# Patient Record
Sex: FEMALE | Race: WHITE | NOT HISPANIC OR LATINO | Employment: OTHER | URBAN - METROPOLITAN AREA
[De-identification: names, ages, dates, MRNs, and addresses within clinical notes are randomized per-mention and may not be internally consistent; named-entity substitution may affect disease eponyms.]

---

## 2018-04-25 ENCOUNTER — HOSPITAL ENCOUNTER (OUTPATIENT)
Dept: RADIOLOGY | Facility: MEDICAL CENTER | Age: 73
End: 2018-04-25
Attending: PHYSICAL MEDICINE & REHABILITATION
Payer: MEDICARE

## 2018-04-25 DIAGNOSIS — M54.30 SCIATICA, UNSPECIFIED LATERALITY: ICD-10-CM

## 2018-04-25 DIAGNOSIS — M54.16 LUMBAR RADICULOPATHY: ICD-10-CM

## 2018-04-25 PROCEDURE — 72148 MRI LUMBAR SPINE W/O DYE: CPT

## 2018-07-02 ENCOUNTER — HOSPITAL ENCOUNTER (OUTPATIENT)
Dept: RADIOLOGY | Facility: MEDICAL CENTER | Age: 73
End: 2018-07-02
Attending: NURSE PRACTITIONER
Payer: MEDICARE

## 2018-07-02 DIAGNOSIS — M25.571 RIGHT ANKLE PAIN, UNSPECIFIED CHRONICITY: ICD-10-CM

## 2018-07-02 DIAGNOSIS — M12.871 ARTHROPATHY, TRANSIENT, ANKLE OR FOOT, RIGHT: ICD-10-CM

## 2018-07-02 PROCEDURE — 73700 CT LOWER EXTREMITY W/O DYE: CPT | Mod: RT

## 2018-10-08 DIAGNOSIS — Z01.810 PRE-OPERATIVE CARDIOVASCULAR EXAMINATION: ICD-10-CM

## 2018-10-08 DIAGNOSIS — Z01.812 PRE-OPERATIVE LABORATORY EXAMINATION: ICD-10-CM

## 2018-10-08 LAB
ANION GAP SERPL CALC-SCNC: 7 MMOL/L (ref 0–11.9)
BUN SERPL-MCNC: 20 MG/DL (ref 8–22)
CALCIUM SERPL-MCNC: 10.1 MG/DL (ref 8.5–10.5)
CHLORIDE SERPL-SCNC: 101 MMOL/L (ref 96–112)
CO2 SERPL-SCNC: 27 MMOL/L (ref 20–33)
CREAT SERPL-MCNC: 0.61 MG/DL (ref 0.5–1.4)
EKG IMPRESSION: NORMAL
ERYTHROCYTE [DISTWIDTH] IN BLOOD BY AUTOMATED COUNT: 38.8 FL (ref 35.9–50)
GLUCOSE SERPL-MCNC: 97 MG/DL (ref 65–99)
HCT VFR BLD AUTO: 42.9 % (ref 37–47)
HGB BLD-MCNC: 14.1 G/DL (ref 12–16)
MCH RBC QN AUTO: 29.7 PG (ref 27–33)
MCHC RBC AUTO-ENTMCNC: 32.9 G/DL (ref 33.6–35)
MCV RBC AUTO: 90.3 FL (ref 81.4–97.8)
PLATELET # BLD AUTO: 154 K/UL (ref 164–446)
PMV BLD AUTO: 11.2 FL (ref 9–12.9)
POTASSIUM SERPL-SCNC: 4.3 MMOL/L (ref 3.6–5.5)
RBC # BLD AUTO: 4.75 M/UL (ref 4.2–5.4)
SODIUM SERPL-SCNC: 135 MMOL/L (ref 135–145)
WBC # BLD AUTO: 4.8 K/UL (ref 4.8–10.8)

## 2018-10-08 PROCEDURE — 85027 COMPLETE CBC AUTOMATED: CPT

## 2018-10-08 PROCEDURE — 93010 ELECTROCARDIOGRAM REPORT: CPT | Performed by: INTERNAL MEDICINE

## 2018-10-08 PROCEDURE — 36415 COLL VENOUS BLD VENIPUNCTURE: CPT

## 2018-10-08 PROCEDURE — 80048 BASIC METABOLIC PNL TOTAL CA: CPT

## 2018-10-08 PROCEDURE — 93005 ELECTROCARDIOGRAM TRACING: CPT

## 2018-10-08 RX ORDER — GABAPENTIN 100 MG/1
900 CAPSULE ORAL 3 TIMES DAILY
COMMUNITY

## 2018-10-08 RX ORDER — PREDNISONE 1 MG/1
4 TABLET ORAL EVERY MORNING
COMMUNITY

## 2018-10-08 RX ORDER — IBUPROFEN 200 MG
400 TABLET ORAL 3 TIMES DAILY
COMMUNITY

## 2018-10-08 NOTE — DISCHARGE PLANNING
Met with pt and her  Luciano.  She currently has a cane and shower stool. Her neighbor is loaning her a knee scooter and a rolling walker.  She has ramps around her home to get in and just 1 step.  She has crutches ordered and we discussed and she said she felt more comfortable with a FWW. 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 Spoke to Kalee at Dr. medrano's office and she will be sending new orders for a walker. She will be going home with  and son Alex will be there as well for recovery.  She has a f/u appt with Dr. Medrano on 10/30.   no

## 2018-10-15 ENCOUNTER — APPOINTMENT (OUTPATIENT)
Dept: RADIOLOGY | Facility: MEDICAL CENTER | Age: 73
DRG: 469 | End: 2018-10-15
Attending: ORTHOPAEDIC SURGERY
Payer: MEDICARE

## 2018-10-15 ENCOUNTER — HOSPITAL ENCOUNTER (INPATIENT)
Facility: MEDICAL CENTER | Age: 73
LOS: 1 days | DRG: 469 | End: 2018-10-16
Attending: ORTHOPAEDIC SURGERY | Admitting: ORTHOPAEDIC SURGERY
Payer: MEDICARE

## 2018-10-15 PROCEDURE — 700112 HCHG RX REV CODE 229: Performed by: ORTHOPAEDIC SURGERY

## 2018-10-15 PROCEDURE — 160022 HCHG BLOCK: Performed by: ORTHOPAEDIC SURGERY

## 2018-10-15 PROCEDURE — 500881 HCHG PACK, EXTREMITY: Performed by: ORTHOPAEDIC SURGERY

## 2018-10-15 PROCEDURE — 502000 HCHG MISC OR IMPLANTS RC 0278: Performed by: ORTHOPAEDIC SURGERY

## 2018-10-15 PROCEDURE — 160009 HCHG ANES TIME/MIN: Performed by: ORTHOPAEDIC SURGERY

## 2018-10-15 PROCEDURE — A9270 NON-COVERED ITEM OR SERVICE: HCPCS | Performed by: ANESTHESIOLOGY

## 2018-10-15 PROCEDURE — A6454 SELF-ADHER BAND W>=3" <5"/YD: HCPCS | Performed by: ORTHOPAEDIC SURGERY

## 2018-10-15 PROCEDURE — 0QHG04Z INSERTION OF INTERNAL FIXATION DEVICE INTO RIGHT TIBIA, OPEN APPROACH: ICD-10-PCS | Performed by: ORTHOPAEDIC SURGERY

## 2018-10-15 PROCEDURE — 160029 HCHG SURGERY MINUTES - 1ST 30 MINS LEVEL 4: Performed by: ORTHOPAEDIC SURGERY

## 2018-10-15 PROCEDURE — 700111 HCHG RX REV CODE 636 W/ 250 OVERRIDE (IP): Performed by: ORTHOPAEDIC SURGERY

## 2018-10-15 PROCEDURE — A9270 NON-COVERED ITEM OR SERVICE: HCPCS | Performed by: ORTHOPAEDIC SURGERY

## 2018-10-15 PROCEDURE — A6223 GAUZE >16<=48 NO W/SAL W/O B: HCPCS | Performed by: ORTHOPAEDIC SURGERY

## 2018-10-15 PROCEDURE — 160035 HCHG PACU - 1ST 60 MINS PHASE I: Performed by: ORTHOPAEDIC SURGERY

## 2018-10-15 PROCEDURE — 700111 HCHG RX REV CODE 636 W/ 250 OVERRIDE (IP)

## 2018-10-15 PROCEDURE — 500367 HCHG DRAIN KIT, HEMOVAC: Performed by: ORTHOPAEDIC SURGERY

## 2018-10-15 PROCEDURE — 501838 HCHG SUTURE GENERAL: Performed by: ORTHOPAEDIC SURGERY

## 2018-10-15 PROCEDURE — 700101 HCHG RX REV CODE 250

## 2018-10-15 PROCEDURE — 0SRF0JZ REPLACEMENT OF RIGHT ANKLE JOINT WITH SYNTHETIC SUBSTITUTE, OPEN APPROACH: ICD-10-PCS | Performed by: ORTHOPAEDIC SURGERY

## 2018-10-15 PROCEDURE — 0QUG07Z SUPPLEMENT RIGHT TIBIA WITH AUTOLOGOUS TISSUE SUBSTITUTE, OPEN APPROACH: ICD-10-PCS | Performed by: ORTHOPAEDIC SURGERY

## 2018-10-15 PROCEDURE — 160002 HCHG RECOVERY MINUTES (STAT): Performed by: ORTHOPAEDIC SURGERY

## 2018-10-15 PROCEDURE — 502240 HCHG MISC OR SUPPLY RC 0272: Performed by: ORTHOPAEDIC SURGERY

## 2018-10-15 PROCEDURE — 700102 HCHG RX REV CODE 250 W/ 637 OVERRIDE(OP): Performed by: ANESTHESIOLOGY

## 2018-10-15 PROCEDURE — A4306 DRUG DELIVERY SYSTEM <=50 ML: HCPCS | Performed by: ORTHOPAEDIC SURGERY

## 2018-10-15 PROCEDURE — 160041 HCHG SURGERY MINUTES - EA ADDL 1 MIN LEVEL 4: Performed by: ORTHOPAEDIC SURGERY

## 2018-10-15 PROCEDURE — 503036 HCHG GUIDE PIN,OIC: Performed by: ORTHOPAEDIC SURGERY

## 2018-10-15 PROCEDURE — 700102 HCHG RX REV CODE 250 W/ 637 OVERRIDE(OP): Performed by: ORTHOPAEDIC SURGERY

## 2018-10-15 PROCEDURE — A9272 DISP WOUND SUCT, DRSG/ACCESS: HCPCS | Performed by: ORTHOPAEDIC SURGERY

## 2018-10-15 PROCEDURE — 700111 HCHG RX REV CODE 636 W/ 250 OVERRIDE (IP): Performed by: ANESTHESIOLOGY

## 2018-10-15 PROCEDURE — 770001 HCHG ROOM/CARE - MED/SURG/GYN PRIV*

## 2018-10-15 PROCEDURE — 160048 HCHG OR STATISTICAL LEVEL 1-5: Performed by: ORTHOPAEDIC SURGERY

## 2018-10-15 PROCEDURE — 73610 X-RAY EXAM OF ANKLE: CPT | Mod: RT

## 2018-10-15 PROCEDURE — 503339 HCHG DRESSSING PICO: Performed by: ORTHOPAEDIC SURGERY

## 2018-10-15 PROCEDURE — 160036 HCHG PACU - EA ADDL 30 MINS PHASE I: Performed by: ORTHOPAEDIC SURGERY

## 2018-10-15 PROCEDURE — C1713 ANCHOR/SCREW BN/BN,TIS/BN: HCPCS | Performed by: ORTHOPAEDIC SURGERY

## 2018-10-15 PROCEDURE — A4306 DRUG DELIVERY SYSTEM <=50 ML: HCPCS | Performed by: ANESTHESIOLOGY

## 2018-10-15 DEVICE — SCREW CANN 4.0X40 SHORT OIC (3TX3+2TX2=13): Type: IMPLANTABLE DEVICE | Site: ANKLE | Status: FUNCTIONAL

## 2018-10-15 DEVICE — IMPLANTABLE DEVICE: Type: IMPLANTABLE DEVICE | Site: ANKLE | Status: FUNCTIONAL

## 2018-10-15 RX ORDER — HALOPERIDOL 5 MG/ML
1 INJECTION INTRAMUSCULAR
Status: DISCONTINUED | OUTPATIENT
Start: 2018-10-15 | End: 2018-10-15 | Stop reason: HOSPADM

## 2018-10-15 RX ORDER — GABAPENTIN 300 MG/1
300 CAPSULE ORAL ONCE
Status: COMPLETED | OUTPATIENT
Start: 2018-10-15 | End: 2018-10-15

## 2018-10-15 RX ORDER — ACETAMINOPHEN 500 MG
1000 TABLET ORAL ONCE
Status: COMPLETED | OUTPATIENT
Start: 2018-10-15 | End: 2018-10-15

## 2018-10-15 RX ORDER — DOCUSATE SODIUM 100 MG/1
100 CAPSULE, LIQUID FILLED ORAL 2 TIMES DAILY
COMMUNITY

## 2018-10-15 RX ORDER — CEFAZOLIN SODIUM 2 G/100ML
2 INJECTION, SOLUTION INTRAVENOUS EVERY 8 HOURS
Status: COMPLETED | OUTPATIENT
Start: 2018-10-15 | End: 2018-10-16

## 2018-10-15 RX ORDER — SODIUM CHLORIDE, SODIUM LACTATE, POTASSIUM CHLORIDE, CALCIUM CHLORIDE 600; 310; 30; 20 MG/100ML; MG/100ML; MG/100ML; MG/100ML
INJECTION, SOLUTION INTRAVENOUS ONCE
Status: COMPLETED | OUTPATIENT
Start: 2018-10-15 | End: 2018-10-15

## 2018-10-15 RX ORDER — DOCUSATE SODIUM 100 MG/1
100 CAPSULE, LIQUID FILLED ORAL 2 TIMES DAILY
Status: DISCONTINUED | OUTPATIENT
Start: 2018-10-15 | End: 2018-10-16 | Stop reason: HOSPADM

## 2018-10-15 RX ORDER — OXYCODONE HYDROCHLORIDE 5 MG/1
5 TABLET ORAL
Status: DISCONTINUED | OUTPATIENT
Start: 2018-10-15 | End: 2018-10-16 | Stop reason: HOSPADM

## 2018-10-15 RX ORDER — LABETALOL HYDROCHLORIDE 5 MG/ML
5 INJECTION, SOLUTION INTRAVENOUS
Status: DISCONTINUED | OUTPATIENT
Start: 2018-10-15 | End: 2018-10-15 | Stop reason: HOSPADM

## 2018-10-15 RX ORDER — ONDANSETRON 2 MG/ML
4 INJECTION INTRAMUSCULAR; INTRAVENOUS EVERY 4 HOURS PRN
Status: DISCONTINUED | OUTPATIENT
Start: 2018-10-15 | End: 2018-10-16 | Stop reason: HOSPADM

## 2018-10-15 RX ORDER — AMOXICILLIN 250 MG
1 CAPSULE ORAL
Status: DISCONTINUED | OUTPATIENT
Start: 2018-10-15 | End: 2018-10-16 | Stop reason: HOSPADM

## 2018-10-15 RX ORDER — SODIUM CHLORIDE, SODIUM LACTATE, POTASSIUM CHLORIDE, CALCIUM CHLORIDE 600; 310; 30; 20 MG/100ML; MG/100ML; MG/100ML; MG/100ML
INJECTION, SOLUTION INTRAVENOUS CONTINUOUS
Status: DISCONTINUED | OUTPATIENT
Start: 2018-10-15 | End: 2018-10-15 | Stop reason: HOSPADM

## 2018-10-15 RX ORDER — LIDOCAINE HYDROCHLORIDE 10 MG/ML
INJECTION, SOLUTION INFILTRATION; PERINEURAL
Status: COMPLETED
Start: 2018-10-15 | End: 2018-10-15

## 2018-10-15 RX ORDER — AMOXICILLIN 250 MG
1 CAPSULE ORAL NIGHTLY
Status: DISCONTINUED | OUTPATIENT
Start: 2018-10-15 | End: 2018-10-16 | Stop reason: HOSPADM

## 2018-10-15 RX ORDER — MAGNESIUM HYDROXIDE 1200 MG/15ML
LIQUID ORAL
Status: COMPLETED | OUTPATIENT
Start: 2018-10-15 | End: 2018-10-15

## 2018-10-15 RX ORDER — PREDNISONE 1 MG/1
4 TABLET ORAL EVERY MORNING
Status: DISCONTINUED | OUTPATIENT
Start: 2018-10-16 | End: 2018-10-16 | Stop reason: HOSPADM

## 2018-10-15 RX ORDER — DIPHENHYDRAMINE HYDROCHLORIDE 50 MG/ML
25 INJECTION INTRAMUSCULAR; INTRAVENOUS EVERY 6 HOURS PRN
Status: DISCONTINUED | OUTPATIENT
Start: 2018-10-15 | End: 2018-10-16 | Stop reason: HOSPADM

## 2018-10-15 RX ORDER — ENEMA 19; 7 G/133ML; G/133ML
1 ENEMA RECTAL
Status: DISCONTINUED | OUTPATIENT
Start: 2018-10-15 | End: 2018-10-16 | Stop reason: HOSPADM

## 2018-10-15 RX ORDER — BISACODYL 10 MG
10 SUPPOSITORY, RECTAL RECTAL
Status: DISCONTINUED | OUTPATIENT
Start: 2018-10-15 | End: 2018-10-16 | Stop reason: HOSPADM

## 2018-10-15 RX ORDER — ACETAMINOPHEN 500 MG
1000 TABLET ORAL EVERY 6 HOURS
Status: DISCONTINUED | OUTPATIENT
Start: 2018-10-16 | End: 2018-10-16 | Stop reason: HOSPADM

## 2018-10-15 RX ORDER — ASPIRIN 81 MG/1
81 TABLET, CHEWABLE ORAL EVERY MORNING
Status: DISCONTINUED | OUTPATIENT
Start: 2018-10-16 | End: 2018-10-16 | Stop reason: HOSPADM

## 2018-10-15 RX ORDER — HYDROMORPHONE HYDROCHLORIDE 2 MG/ML
0.2 INJECTION, SOLUTION INTRAMUSCULAR; INTRAVENOUS; SUBCUTANEOUS
Status: DISCONTINUED | OUTPATIENT
Start: 2018-10-15 | End: 2018-10-15 | Stop reason: HOSPADM

## 2018-10-15 RX ORDER — OXYCODONE HCL 5 MG/5 ML
5 SOLUTION, ORAL ORAL
Status: COMPLETED | OUTPATIENT
Start: 2018-10-15 | End: 2018-10-15

## 2018-10-15 RX ORDER — MIDAZOLAM HYDROCHLORIDE 1 MG/ML
1 INJECTION INTRAMUSCULAR; INTRAVENOUS
Status: DISCONTINUED | OUTPATIENT
Start: 2018-10-15 | End: 2018-10-15 | Stop reason: HOSPADM

## 2018-10-15 RX ORDER — HYDROMORPHONE HYDROCHLORIDE 2 MG/ML
0.5 INJECTION, SOLUTION INTRAMUSCULAR; INTRAVENOUS; SUBCUTANEOUS
Status: DISCONTINUED | OUTPATIENT
Start: 2018-10-15 | End: 2018-10-16 | Stop reason: HOSPADM

## 2018-10-15 RX ORDER — MEPERIDINE HYDROCHLORIDE 25 MG/ML
12.5 INJECTION INTRAMUSCULAR; INTRAVENOUS; SUBCUTANEOUS
Status: DISCONTINUED | OUTPATIENT
Start: 2018-10-15 | End: 2018-10-15 | Stop reason: HOSPADM

## 2018-10-15 RX ORDER — ONDANSETRON 2 MG/ML
4 INJECTION INTRAMUSCULAR; INTRAVENOUS
Status: DISCONTINUED | OUTPATIENT
Start: 2018-10-15 | End: 2018-10-15 | Stop reason: HOSPADM

## 2018-10-15 RX ORDER — OXYCODONE HYDROCHLORIDE 10 MG/1
10 TABLET ORAL
Status: DISCONTINUED | OUTPATIENT
Start: 2018-10-15 | End: 2018-10-16 | Stop reason: HOSPADM

## 2018-10-15 RX ORDER — DIPHENHYDRAMINE HYDROCHLORIDE 50 MG/ML
12.5 INJECTION INTRAMUSCULAR; INTRAVENOUS
Status: DISCONTINUED | OUTPATIENT
Start: 2018-10-15 | End: 2018-10-15 | Stop reason: HOSPADM

## 2018-10-15 RX ORDER — DOCUSATE SODIUM 100 MG/1
100 CAPSULE, LIQUID FILLED ORAL 2 TIMES DAILY
Status: DISCONTINUED | OUTPATIENT
Start: 2018-10-15 | End: 2018-10-15

## 2018-10-15 RX ORDER — METOPROLOL TARTRATE 1 MG/ML
1 INJECTION, SOLUTION INTRAVENOUS
Status: DISCONTINUED | OUTPATIENT
Start: 2018-10-15 | End: 2018-10-15 | Stop reason: HOSPADM

## 2018-10-15 RX ORDER — HYDROMORPHONE HYDROCHLORIDE 2 MG/ML
0.1 INJECTION, SOLUTION INTRAMUSCULAR; INTRAVENOUS; SUBCUTANEOUS
Status: DISCONTINUED | OUTPATIENT
Start: 2018-10-15 | End: 2018-10-15 | Stop reason: HOSPADM

## 2018-10-15 RX ORDER — GABAPENTIN 100 MG/1
200 CAPSULE ORAL
Status: DISCONTINUED | OUTPATIENT
Start: 2018-10-15 | End: 2018-10-16 | Stop reason: HOSPADM

## 2018-10-15 RX ORDER — HYDROMORPHONE HYDROCHLORIDE 2 MG/ML
0.4 INJECTION, SOLUTION INTRAMUSCULAR; INTRAVENOUS; SUBCUTANEOUS
Status: DISCONTINUED | OUTPATIENT
Start: 2018-10-15 | End: 2018-10-15 | Stop reason: HOSPADM

## 2018-10-15 RX ORDER — OXYCODONE HCL 5 MG/5 ML
10 SOLUTION, ORAL ORAL
Status: COMPLETED | OUTPATIENT
Start: 2018-10-15 | End: 2018-10-15

## 2018-10-15 RX ORDER — POLYETHYLENE GLYCOL 3350 17 G/17G
1 POWDER, FOR SOLUTION ORAL 2 TIMES DAILY PRN
Status: DISCONTINUED | OUTPATIENT
Start: 2018-10-15 | End: 2018-10-16 | Stop reason: HOSPADM

## 2018-10-15 RX ORDER — HYDRALAZINE HYDROCHLORIDE 20 MG/ML
5 INJECTION INTRAMUSCULAR; INTRAVENOUS
Status: DISCONTINUED | OUTPATIENT
Start: 2018-10-15 | End: 2018-10-15 | Stop reason: HOSPADM

## 2018-10-15 RX ADMIN — SODIUM CHLORIDE, SODIUM LACTATE, POTASSIUM CHLORIDE, CALCIUM CHLORIDE: 600; 310; 30; 20 INJECTION, SOLUTION INTRAVENOUS at 12:49

## 2018-10-15 RX ADMIN — GABAPENTIN 200 MG: 100 CAPSULE ORAL at 22:39

## 2018-10-15 RX ADMIN — ACETAMINOPHEN 1000 MG: 500 TABLET ORAL at 23:29

## 2018-10-15 RX ADMIN — OXYCODONE HYDROCHLORIDE 5 MG: 5 SOLUTION ORAL at 20:14

## 2018-10-15 RX ADMIN — LIDOCAINE HYDROCHLORIDE 0.5 ML: 10 INJECTION, SOLUTION INFILTRATION; PERINEURAL at 12:49

## 2018-10-15 RX ADMIN — SODIUM CHLORIDE, SODIUM LACTATE, POTASSIUM CHLORIDE, CALCIUM CHLORIDE: 600; 310; 30; 20 INJECTION, SOLUTION INTRAVENOUS at 20:15

## 2018-10-15 RX ADMIN — CEFAZOLIN SODIUM 2 G: 2 INJECTION, SOLUTION INTRAVENOUS at 22:39

## 2018-10-15 RX ADMIN — GABAPENTIN 300 MG: 300 CAPSULE ORAL at 13:02

## 2018-10-15 RX ADMIN — ACETAMINOPHEN 1000 MG: 500 TABLET, FILM COATED ORAL at 13:02

## 2018-10-15 RX ADMIN — OXYCODONE HYDROCHLORIDE 5 MG: 5 TABLET ORAL at 23:29

## 2018-10-15 RX ADMIN — DOCUSATE SODIUM 100 MG: 100 CAPSULE, LIQUID FILLED ORAL at 22:39

## 2018-10-15 ASSESSMENT — COGNITIVE AND FUNCTIONAL STATUS - GENERAL
DAILY ACTIVITIY SCORE: 22
DRESSING REGULAR LOWER BODY CLOTHING: A LITTLE
TOILETING: A LITTLE
STANDING UP FROM CHAIR USING ARMS: A LITTLE
SUGGESTED CMS G CODE MODIFIER DAILY ACTIVITY: CJ
SUGGESTED CMS G CODE MODIFIER MOBILITY: CJ
MOBILITY SCORE: 21
WALKING IN HOSPITAL ROOM: A LITTLE
CLIMB 3 TO 5 STEPS WITH RAILING: A LITTLE

## 2018-10-15 ASSESSMENT — LIFESTYLE VARIABLES
EVER_SMOKED: NEVER
ALCOHOL_USE: NO

## 2018-10-15 ASSESSMENT — PATIENT HEALTH QUESTIONNAIRE - PHQ9
1. LITTLE INTEREST OR PLEASURE IN DOING THINGS: NOT AT ALL
SUM OF ALL RESPONSES TO PHQ9 QUESTIONS 1 AND 2: 0
2. FEELING DOWN, DEPRESSED, IRRITABLE, OR HOPELESS: NOT AT ALL

## 2018-10-15 ASSESSMENT — PAIN SCALES - GENERAL
PAINLEVEL_OUTOF10: 4
PAINLEVEL_OUTOF10: 0
PAINLEVEL_OUTOF10: 3

## 2018-10-16 VITALS
OXYGEN SATURATION: 97 % | HEART RATE: 79 BPM | TEMPERATURE: 98.2 F | SYSTOLIC BLOOD PRESSURE: 118 MMHG | HEIGHT: 66 IN | RESPIRATION RATE: 15 BRPM | WEIGHT: 130.51 LBS | DIASTOLIC BLOOD PRESSURE: 61 MMHG | BODY MASS INDEX: 20.98 KG/M2

## 2018-10-16 PROCEDURE — 700111 HCHG RX REV CODE 636 W/ 250 OVERRIDE (IP): Performed by: ORTHOPAEDIC SURGERY

## 2018-10-16 PROCEDURE — G8989 SELF CARE D/C STATUS: HCPCS | Mod: CJ

## 2018-10-16 PROCEDURE — 97165 OT EVAL LOW COMPLEX 30 MIN: CPT

## 2018-10-16 PROCEDURE — 700112 HCHG RX REV CODE 229: Performed by: ORTHOPAEDIC SURGERY

## 2018-10-16 PROCEDURE — G8978 MOBILITY CURRENT STATUS: HCPCS | Mod: CI

## 2018-10-16 PROCEDURE — 700102 HCHG RX REV CODE 250 W/ 637 OVERRIDE(OP): Mod: JG | Performed by: ORTHOPAEDIC SURGERY

## 2018-10-16 PROCEDURE — G8979 MOBILITY GOAL STATUS: HCPCS | Mod: CI

## 2018-10-16 PROCEDURE — G8987 SELF CARE CURRENT STATUS: HCPCS | Mod: CJ

## 2018-10-16 PROCEDURE — G8988 SELF CARE GOAL STATUS: HCPCS | Mod: CJ

## 2018-10-16 PROCEDURE — A9270 NON-COVERED ITEM OR SERVICE: HCPCS | Performed by: ORTHOPAEDIC SURGERY

## 2018-10-16 PROCEDURE — G8980 MOBILITY D/C STATUS: HCPCS | Mod: CI

## 2018-10-16 PROCEDURE — A9270 NON-COVERED ITEM OR SERVICE: HCPCS | Mod: JG | Performed by: ORTHOPAEDIC SURGERY

## 2018-10-16 PROCEDURE — 97161 PT EVAL LOW COMPLEX 20 MIN: CPT

## 2018-10-16 RX ADMIN — CEFAZOLIN SODIUM 2 G: 2 INJECTION, SOLUTION INTRAVENOUS at 04:54

## 2018-10-16 RX ADMIN — PSYLLIUM HUSK 1 PACKET: 3.4 POWDER ORAL at 04:55

## 2018-10-16 RX ADMIN — ACETAMINOPHEN 1000 MG: 500 TABLET ORAL at 04:55

## 2018-10-16 RX ADMIN — PREDNISONE 4 MG: 1 TABLET ORAL at 06:42

## 2018-10-16 RX ADMIN — ESTROGENS, CONJUGATED 0.45 MG: 0.45 TABLET, FILM COATED ORAL at 06:00

## 2018-10-16 RX ADMIN — DOCUSATE SODIUM 100 MG: 100 CAPSULE, LIQUID FILLED ORAL at 04:55

## 2018-10-16 RX ADMIN — OXYCODONE HYDROCHLORIDE 5 MG: 5 TABLET ORAL at 11:25

## 2018-10-16 RX ADMIN — Medication 81 MG: at 04:55

## 2018-10-16 RX ADMIN — ACETAMINOPHEN 1000 MG: 500 TABLET ORAL at 11:25

## 2018-10-16 ASSESSMENT — COGNITIVE AND FUNCTIONAL STATUS - GENERAL
SUGGESTED CMS G CODE MODIFIER MOBILITY: CI
SUGGESTED CMS G CODE MODIFIER DAILY ACTIVITY: CJ
TOILETING: A LITTLE
CLIMB 3 TO 5 STEPS WITH RAILING: A LITTLE
DAILY ACTIVITIY SCORE: 22
MOBILITY SCORE: 23
HELP NEEDED FOR BATHING: A LITTLE

## 2018-10-16 ASSESSMENT — GAIT ASSESSMENTS
DISTANCE (FEET): 30
DEVIATION: STEP TO;OTHER (COMMENT)
GAIT LEVEL OF ASSIST: STAND BY ASSIST
ASSISTIVE DEVICE: FRONT WHEEL WALKER

## 2018-10-16 ASSESSMENT — PAIN SCALES - GENERAL
PAINLEVEL_OUTOF10: 2
PAINLEVEL_OUTOF10: 5

## 2018-10-16 ASSESSMENT — ACTIVITIES OF DAILY LIVING (ADL): TOILETING: INDEPENDENT

## 2018-10-16 NOTE — DISCHARGE INSTRUCTIONS
Discharge Instructions    Discharged to home by car with relative. Discharged via wheelchair, hospital escort: Yes.  Special equipment needed: Walker    Be sure to schedule a follow-up appointment with your primary care doctor or any specialists as instructed.     Discharge Plan:   Influenza Vaccine Indication: Not indicated: Previously immunized this influenza season and > 8 years of age    I understand that a diet low in cholesterol, fat, and sodium is recommended for good health. Unless I have been given specific instructions below for another diet, I accept this instruction as my diet prescription.   Other diet: Regular    Special Instructions: Discharge instructions for the Orthopedic Patient    Follow up with Primary Care Physician within 2 weeks of discharge to home, regarding:  Review of medications and diagnostic testing.  Surveillance for medical complications.  Workup and treatment of osteoporosis, if appropriate.     -Is this a Joint Replacement patient? No    -Is this patient being discharged with medication to prevent blood clots?  yes    · Is patient discharged on Warfarin / Coumadin?   No     NWB RLE but ok to rest toes on ground when mobilizing   Elevate RLE above heart to decrease pain and swelling   Keep splint c/d/i until follow-up   Pull hemovac drain on POD#2 (Wednesday) if not pulled prior to discharge   Pull OnQ catheter on POD#3 (Thursday)   Turn on Incisional Vac pump on POD#7 (Monday), but leave dressing in place until follow-up   Resume regular home diet   Follow-up with Dr. Casarez in 2 weeks      How can pain medicine affect me?  You were prescribed pain medicine. This medicine may:  · Make you tired or sleepy.  · Make you feel dizzy.  · Affect how well you can:  ¨ Drive  ¨ Do certain activities.  Pain medicine may not make all of your pain go away. You should be comfortable enough to:  · Move.  · Breathe.  · Take care of yourself.  How often should I take pain medicine and how much  should I take?  · Take pain medicine only as told by your doctor and only as needed for pain.  · You do not need to take pain medicine if you are not having pain, unless your doctor tells you to do that.  · You can take less than the prescribed dose if you find that less medicine helps your pain.  · If you have very bad (severe) pain, call your doctor. Do not take more pills than told by your doctor. Do not take pills more often than told by your doctor.  What should I avoid while I am taking pain medicine?  Follow these instructions after you start taking pain medicine, while you are taking the medicine, and for 8 hours after you stop taking the medicine:  · Do not drive.  · Do not use machinery.  · Do not use power tools.  · Do not sign legal documents.  · Do not drink alcohol.  · Do not take sleeping pills.  · Do not take care of children by yourself.  · Do not do any activities that involve climbing or being in high places.  · Do not go into any body of water unless there is an adult nearby who can watch and help you. This includes:  ¨ Lakes.  ¨ Rivers.  ¨ Oceans.  ¨ Spas.  ¨ Swimming pools.  How can I keep others safe while I am taking pain medicine?  · Store your pain medicine as told by your doctor. Make sure that you keep it where children and pets cannot reach it.  · Do not share your pain medicine with anyone.  · Do not save any leftover pills. If you have any leftover pain medicine, get rid of it or destroy it as told by your doctor.  What else do I need to know about taking pain medicine?  · Use a poop (stool) softener if you have trouble pooping (constipation) because of your pain medicine. Eating more fruits and vegetables also helps with constipation.  · Write down the times when you take your pain medicine. Look at the times before you take your next dose of medicine.  · Your pain medicine might have acetaminophen in it. Do not take any other acetaminophen while you are taking this medicine. An  overdose of acetaminophen can do very bad damage to your liver. If you are taking any medicines in addition to your pain medicine, check the active ingredients on those medicines to see if acetaminophen is listed.  When should I call my doctor?  · Your medicine is not helping the pain.  · You do either of these soon after you take the medicine:  ¨ Throw up (vomit).  ¨ Have watery poop (diarrhea).  · You have new pain in areas that did not hurt before.  · You have an allergic reaction to your medicine. This may include:  ¨ Feeling itchy.  ¨ Swelling.  ¨ Feeling dizzy.  ¨ Getting a new rash.  · You cannot put up with feeling:  ¨ Dizzy.  ¨ Sick to your stomach (nauseous).  When should I call 911 or go to the emergency room?  · You pass out (faint).  · You feel very confused.  · You throw up again and again.  · Your skin or lips turn pale or bluish in color.  · You are:  ¨ Short of breath.  ¨ Breathing much more slowly than usual.  · You have a very bad allergic reaction to your medicine. This includes:  ¨ Developing a swollen tongue.  ¨ Having trouble breathing.  This information is not intended to replace advice given to you by your health care provider. Make sure you discuss any questions you have with your health care provider.  Document Released: 06/05/2009 Document Revised: 08/24/2017 Document Reviewed: 10/22/2015  © 2017 Elsevier          Aspirin, ASA oral tablets  What is this medicine?  ASPIRIN (AS pir in) is a pain reliever. It is used to treat mild pain and fever. This medicine is also used as directed by a doctor to prevent and to treat heart attacks, to prevent strokes, and to treat arthritis or inflammation.  This medicine may be used for other purposes; ask your health care provider or pharmacist if you have questions.  COMMON BRAND NAME(S): Aspir-Low, Aspir-Deborah, Aspirtab, Paul Advanced Aspirin, Paul Aspirin, Paul Aspirin Extra Strength, Paul Aspirin Plus, Paul Extra Strength, Paul Extra Strength  Plus, Paul Genuine Aspirin, Paul Womens Aspirin, Bufferin, Bufferin Extra Strength, Bufferin Low Dose  What should I tell my health care provider before I take this medicine?  They need to know if you have any of these conditions:  -anemia  -asthma  -bleeding problems  -child with chickenpox, the flu, or other viral infection  -diabetes  -gout  -if you frequently drink alcohol containing drinks  -kidney disease  -liver disease  -low level of vitamin K  -lupus  -smoke tobacco  -stomach ulcers or other problems  -an unusual or allergic reaction to aspirin, tartrazine dye, other medicines, dyes, or preservatives  -pregnant or trying to get pregnant  -breast-feeding  How should I use this medicine?  Take this medicine by mouth with a glass of water. Follow the directions on the package or prescription label. You can take this medicine with or without food. If it upsets your stomach, take it with food. Do not take your medicine more often than directed.  Talk to your pediatrician regarding the use of this medicine in children. While this drug may be prescribed for children as young as 12 years of age for selected conditions, precautions do apply. Children and teenagers should not use this medicine to treat chicken pox or flu symptoms unless directed by a doctor.  Patients over 65 years old may have a stronger reaction and need a smaller dose.  Overdosage: If you think you have taken too much of this medicine contact a poison control center or emergency room at once.  NOTE: This medicine is only for you. Do not share this medicine with others.  What if I miss a dose?  If you are taking this medicine on a regular schedule and miss a dose, take it as soon as you can. If it is almost time for your next dose, take only that dose. Do not take double or extra doses.  What may interact with this medicine?  Do not take this medicine with any of the following medications:  -cidofovir  -ketorolac  -probenecid  This medicine may  also interact with the following medications:  -alcohol  -alendronate  -bismuth subsalicylate  -flavocoxid  -herbal supplements like feverfew, garlic, ursula, ginkgo biloba, horse chestnut  -medicines for diabetes or glaucoma like acetazolamide, methazolamide  -medicines for gout  -medicines that treat or prevent blood clots like enoxaparin, heparin, ticlopidine, warfarin  -other aspirin and aspirin-like medicines  -NSAIDs, medicines for pain and inflammation, like ibuprofen or naproxen  -pemetrexed  -sulfinpyrazone  -varicella live vaccine  This list may not describe all possible interactions. Give your health care provider a list of all the medicines, herbs, non-prescription drugs, or dietary supplements you use. Also tell them if you smoke, drink alcohol, or use illegal drugs. Some items may interact with your medicine.  What should I watch for while using this medicine?  If you are treating yourself for pain, tell your doctor or health care professional if the pain lasts more than 10 days, if it gets worse, or if there is a new or different kind of pain. Tell your doctor if you see redness or swelling. Also, check with your doctor if you have a fever that lasts for more than 3 days. Only take this medicine to prevent heart attacks or blood clotting if prescribed by your doctor or health care professional.  Do not take aspirin or aspirin-like medicines with this medicine. Too much aspirin can be dangerous. Always read the labels carefully.  This medicine can irritate your stomach or cause bleeding problems. Do not smoke cigarettes or drink alcohol while taking this medicine. Do not lie down for 30 minutes after taking this medicine to prevent irritation to your throat.  If you are scheduled for any medical or dental procedure, tell your healthcare provider that you are taking this medicine. You may need to stop taking this medicine before the procedure.  This medicine may be used to treat migraines. If you take  migraine medicines for 10 or more days a month, your migraines may get worse. Keep a diary of headache days and medicine use. Contact your healthcare professional if your migraine attacks occur more frequently.  What side effects may I notice from receiving this medicine?  Side effects that you should report to your doctor or health care professional as soon as possible:  -allergic reactions like skin rash, itching or hives, swelling of the face, lips, or tongue  -breathing problems  -changes in hearing, ringing in the ears  -confusion  -general ill feeling or flu-like symptoms  -pain on swallowing  -redness, blistering, peeling or loosening of the skin, including inside the mouth or nose  -signs and symptoms of bleeding such as bloody or black, tarry stools; red or dark-brown urine; spitting up blood or brown material that looks like coffee grounds; red spots on the skin; unusual bruising or bleeding from the eye, gums, or nose  -trouble passing urine or change in the amount of urine  -unusually weak or tired  -yellowing of the eyes or skin  Side effects that usually do not require medical attention (report to your doctor or health care professional if they continue or are bothersome):  -diarrhea or constipation  -headache  -nausea, vomiting  -stomach gas, heartburn  This list may not describe all possible side effects. Call your doctor for medical advice about side effects. You may report side effects to FDA at 0-366-FDA-6872.  Where should I keep my medicine?  Keep out of the reach of children.  Store at room temperature between 15 and 30 degrees C (59 and 86 degrees F). Protect from heat and moisture. Do not use this medicine if it has a strong vinegar smell. Throw away any unused medicine after the expiration date.  NOTE: This sheet is a summary. It may not cover all possible information. If you have questions about this medicine, talk to your doctor, pharmacist, or health care provider.  © 2018 Elsevier/Gold  Standard (2014-08-19 11:30:31)      Depression / Suicide Risk    As you are discharged from this [facility] facility, it is important to learn how to keep safe from harming yourself.    Recognize the warning signs:  · Abrupt changes in personality, positive or negative- including increase in energy   · Giving away possessions  · Change in eating patterns- significant weight changes-  positive or negative  · Change in sleeping patterns- unable to sleep or sleeping all the time   · Unwillingness or inability to communicate  · Depression  · Unusual sadness, discouragement and loneliness  · Talk of wanting to die  · Neglect of personal appearance   · Rebelliousness- reckless behavior  · Withdrawal from people/activities they love  · Confusion- inability to concentrate     If you or a loved one observes any of these behaviors or has concerns about self-harm, here's what you can do:  · Talk about it- your feelings and reasons for harming yourself  · Remove any means that you might use to hurt yourself (examples: pills, rope, extension cords, firearm)  · Get professional help from the community (Mental Health, Substance Abuse, psychological counseling)  · Do not be alone:Call your Safe Contact- someone whom you trust who will be there for you.  · Call your local CRISIS HOTLINE 439-7436 or 545-375-5256  · Call your local Children's Mobile Crisis Response Team [place] (355) 206-9429 or www.Rescale  · Call the toll free National Suicide Prevention Hotlines   · National Suicide Prevention Lifeline 132-370-DDVV (3442)  · National Hope Line Network 800-SUICIDE (821-8316)

## 2018-10-16 NOTE — PROGRESS NOTES
Pt. discharged to home. Pt left unit via WC with escort.   IV pulled out.   Pt states she has a walker at home  Pt already has prescriptions for pain medications from Dr. Casarez.   Reviewed discharge instructions, follow up appointments, and prescription with the patient. Patient states understanding of all the instructions. Discharge instructions and personal belongings with patient upon leaving.

## 2018-10-16 NOTE — OP REPORT
DATE OF SERVICE:  10/15/2018     PREOPERATIVE DIAGNOSIS:  Right ankle arthritis.     POSTOPERATIVE DIAGNOSIS:  Right ankle arthritis.     PROCEDURE PERFORMED:  1.  Right total ankle arthroplasty.  2.  Right placement of BARRINGTON dressing.    3. Right placement of medial malleolar screws for prophylactic treatment for    potential stress fracture.  4. Right cotton osteotomy with bone graft  5.  Stress Xray ankle     SURGEON:  Scout Casarez MD     FIRST ASSISTANT:  gaby Fuentes MD     SECOND ASSISTANT:  Dr. Alysia Hall.     ANESTHESIA:  General endotracheal with popliteal block catheter per my request   for pain management.     ESTIMATED BLOOD LOSS:  None.     COMPLICATIONS:  None.     DRAINS:  Hemovac x1.     IMPLANTS:  [place] STAR total ankle arthroplasty, tibia Medium, polyethylene 10,   talus Small.     POSTOPERATIVE PLAN:  1.  Perioperative antibiotics.  2.  Follow up in 2 weeks.  3.  Preoperative antibiotics.     INDICATIONS:  The patient is a pleasant 73 year old female.  The patient had problems    with their Right ankle for some time.  The above diagnoses made and options were   discussed including operative and nonoperative.  The patient elected to undergo    operative intervention.  The procedure discussed and all questions were    answered.  Risks of surgery explained to include but not limited to wound    problems, infection, nerve injury, vascular injury, need for further surgery.     The patient understands the potential for persistent risk for pain, malunion, nonunion,    need for further surgery.  The patient understands and accepts these risks and agrees    to proceed.  The site was marked by myself prior to receiving psychotropic    medicines.     PROCEDURE IN DETAIL:  The patient was given a popliteal block and catheter per   my request for pain management.  They were then brought to the operating room and    underwent general endotracheal anesthesia without complications. The Right  lower  extremity was prepped and draped in standard fashion in supine position    with all appropriate padding.  Positive site verification confirmed the patient's Right   lower extremity as well as procedure and confirmation that the patient received    preoperative antibiotics.  Esmarch was used to exsanguinate the foot and ankle   and leg tourniquet was inflated to 250 mmHg.  An anterior incision was made    centered over the anterior ankle.  It was dissected down.  The superficial    peroneal nerve was identified and protected distally.  The interval between    the tibialis anterior and extensor hallus longus was developed.  Neurovascular   bundle was identified and was retracted laterally.  This exposed the ankle    joint.  A guide pin was placed perpendicular to the long axis of the tibia.  Tibial   alignment jig was then placed over the top of this, was pinned in place    parallel to the long axis of the tibia.  This was confirmed on C-arm imaging    on AP view.  On lateral view, the marietta wing was placed and this was    identified to be perpendicular to the long axis of the tibia.  Tibial cut    height was adjusted approximately 5 mm at the subchondral bone margin and    medial and lateral cut was based off the shoulder of the tibia at the medial    malleolar aspect.  This was then pinned in place and the distal tibial    alignment jig was pinned in place.  Malleolar guide pins were placed for later   protection.  Distal tibia cut was made with a combination of oscillating saw    and reciprocating saw.  The tibial cutting jig was removed and all bone was    removed from the distal tibia.  Next, the superior talar cutting guide was    placed onto the tibial alignment jig.  This was then held in neutral position    with slight valgus.  It was pinned in place with talus directly against the    paddle.  The superior cut was made with the oscillating saw.  This was    removed.  The talus was measured to be a SM.  The  guide pin for the     talus was placed through the datum alignment jig was confirmed on    C-arm imaging on AP view to be centered on the talus on lateral view with the    nipple over the lateral process of the talus.  This was then removed and the    anterior, posterior cutting guide was placed over the datum alignment jig pin    and was pinned in place.  The posterior cut was made with the oscillating saw    and the anterior cut was made with the milling drill bit.  An anterior,    posterior cutting guide was then removed and/or guide pin the medial and    lateral cutting guide was then pinned in place into the tibia went to the    talus.  The medial and lateral cuts were made with the reciprocating saw and    bone of the medial and lateral gutters were removed as well as posterior    aspect of the tibia.  Window trial was then placed after the cutting guide was   removed.  It sat very nicely on the bone.  The keel was then drilled.  Window   trial was removed and was punched.  The window trial was also confirmed on    C-arm imaging to be directly against bone as well as direct visualization.     The tibia was then measured and turned to be a Medium.  The gutters were   cleared of all soft tissue.  The talar component was then impacted in place    with the long side lateral.  The barrel guide was then set of the tibia on AP    and lateral views with the blue bar protecting the underlying talar component    was determined to be directly against bone on the lateral.  It was pinned in    place and both the medial and lateral barrels were drilled.  This was removed.    All bone was removed.  A thorough irrigation was performed.  Tibial    component was impacted in place with the blue bar protecting the underlying    talar component.  C-arm imaging confirmed tibia component to remain directly    against the distal tibial bone.  A series of polyethylene trials were placed    and a 10 mm demonstrated good range of motion  with very minimal lift off medial   and lateral. Next, using C-arm imaging, the trial was exchanged   for a 10 mm implant.   The patient's medial malleolar wall was thinned and there was  concern for impending malleolar stress fracture.  Two guide pins from the [facility]    4.0 cannulated screw was placed from the medial malleolus into the distal    tibia avoiding the tibial implant was confirmed on C-arm imaging.  These were    filled with 40 mm partially threaded screws.  The barrels were then bone    Grafted. She still had forefoot varus.  The anterior incision was extended distally to the 1st TMT joint.  A saggital saw was used parallel to the 1st TMT joint to but not through the plantar cortex.  This wa poened up with the Arthrex distractor until forefoot varus was neurtralized.  A 6mm cotton place was placed with 2 screws proximal and distal.  Autologous bone graft was placed into the opening wedge osteotomy.   Final C-arm imaging demonstrates satisfactory position for internal    fixation alignment.  The patient had excellent range of motion.  No gross instability.    Wounds were irrigated with copious irrigation.  Hemovac drain was placed.     The anterior wound was closed with multiple layers closing the capsular layer    followed by an extensor retinacular layer followed by subcutaneous layer, all    with 3-0 Vicryl and 3-0 nylon for the skin.  Sterile dressings were applied    including a negative pressure incisional dressing, which demonstrated a    positive seal.  The patient was placed into a bulky Hernandez splint and woke from    general anesthesia without complications and was transferred to the recovery    room in good condition.  Next, utilization of Dr. Dunn was necessary for   patient positioning, holding, retracting, provisional and definitive    fixation, wound closure, dressing and splint placement.  He was present    throughout the entire procedure.

## 2018-10-16 NOTE — PROGRESS NOTES
Pt came to floor at 2115. Pt post op vitals and assessment was done. Pt was made comfortable in bed. Pt was taught about floor policy.

## 2018-10-16 NOTE — PROGRESS NOTES
· 2 RN skin check complete.   · Devices in place pts left heel is floating on a pillow.  · Skin assessed under devices L heel is red and blanching  · Confirmed pressure ulcers found on no.  · New potential pressure ulcers noted on no. Wound consult and MIDAS placed.  · The following interventions in place pillow in place.    Pts left hand has a bruise. Pt has a splint on the right ankle and an on q pump dressing above that. PTs skin is dry. Pts left heel is red and blanching. PTs left second toe has a callous.

## 2018-10-16 NOTE — THERAPY
"Physical Therapy Evaluation completed.   Bed Mobility:  Supine to Sit: Supervised  Transfers: Sit to Stand: Supervised  Gait: Level Of Assist: Stand by Assist with Front-Wheel Walker       Plan of Care: Patient with no further skilled PT needs in the acute care setting at this time  Discharge Recommendations: Equipment: Front-Wheel Walker.   See \"Rehab Therapy-Acute\" Patient Summary Report for complete documentation.     Pt was seen post op R total ankle arthroplasty with NWB preacautions in place. Pt was able to demonstrate SPV to SBA for all functional mobility w/FWW use. Pt was able to safely demonstrate hop to mechanics w/FWW and demonstrates appropraite functional strength in UE to unweight body for ambulation. Pt declined to attempt steps at this time, as pt will be using a w/c to go up/down steps with assist from spouse or son. Pt educated on ther-ex for RLE to prevent deconitioning of quads and hamstrings. Pt is in no acute skilled PT needs at this time. Anticipate pt to d/c home with family support. Recommend outpatient transitional care services for continued physical therapy services.    "

## 2018-10-16 NOTE — OR NURSING
Attempt to call son multiple times to notify them that there is a delay on getting pt to room.The room was being cleaned, but was stopped.

## 2018-10-16 NOTE — PROGRESS NOTES
[facility] - Foot and Ankle Service    POD# 1 right total ankle arthroplasty, right cotton osteotomy    MIGUE.  Patient denies chest pain, SOB, and dizziness.  Patient is tolerating po intake.    Vitals:    10/15/18 2250 10/15/18 2320 10/16/18 0100 10/16/18 0500   BP: 122/75 122/73 104/61 110/57   Pulse: 84 84 87 86   Resp: 17 17 17 17   Temp: 36.8 °C (98.3 °F) 36.8 °C (98.3 °F) 36.2 °C (97.1 °F) 36.3 °C (97.3 °F)   SpO2: 96% 95% 90% 95%   Weight:       Height:           Lab Results   Component Value Date/Time    WBC 4.8 10/08/2018 04:14 PM    RBC 4.75 10/08/2018 04:14 PM    HEMOGLOBIN 14.1 10/08/2018 04:14 PM    HEMATOCRIT 42.9 10/08/2018 04:14 PM    MCV 90.3 10/08/2018 04:14 PM    MCH 29.7 10/08/2018 04:14 PM    MCHC 32.9 (L) 10/08/2018 04:14 PM    MPV 11.2 10/08/2018 04:14 PM        Lab Results   Component Value Date/Time    SODIUM 135 10/08/2018 04:14 PM    POTASSIUM 4.3 10/08/2018 04:14 PM    CHLORIDE 101 10/08/2018 04:14 PM    CO2 27 10/08/2018 04:14 PM    GLUCOSE 97 10/08/2018 04:14 PM    BUN 20 10/08/2018 04:14 PM    CREATININE 0.61 10/08/2018 04:14 PM        No results found for: PROTHROMBTM, INR     Results     ** No results found for the last 168 hours. **          Drains: hemovac drain 310cc since surgery, incisional vac in place with good seal    Physical Exam:    RLE: splint c/d/i, drain and incisional vac in place, motor intact EHL/FHL, decreased sensation secondary to block, brisk cap refill all toes    A&P:  1) NWB RLE/Ok to rest toes on ground when mobilizing/Mobilize with PT  2) 24 hours Ancef post-op  3) Recheck hemovac output prior to D/C and pull prior to D/C if output less than 40cc over 8 hours.  If still too productive, patient will pull on POD#2  4)Plan for D/C home today  5) F/U with Dr. Casarez in 2 weeks

## 2018-10-16 NOTE — THERAPY
"Occupational Therapy Evaluation completed.   Functional Status: Pt s/p R total ankle arthroplasty, RLE NWB. Pt was able to perform bed mobility with supervision, LB dressing w/ sba, UB dressing supervision, toileting with sba/supervision with techniques to maintain NWB during clothing management, planning on using BSC at home. Pt was able to perform short functional mobility and t/f's using FWW with sba and able to maintain NWB precautions. Pt to have support from spouse and son as needed upon d/c. Pt does not present the need for acute skilled OT services at this time and might benefit from HHS upon d/c home.   Plan of Care: Patient with no further skilled OT needs in the acute care setting at this time  Discharge Recommendations:  Equipment: No Equipment Needed. Post-acute therapy Discharge to home with outpatient or home health for additional skilled therapy services.    See \"Rehab Therapy-Acute\" Patient Summary Report for complete documentation.    "

## 2018-10-16 NOTE — CARE PLAN
Problem: Safety  Goal: Will remain free from injury  Outcome: PROGRESSING AS EXPECTED  Safety precautions in place. Call light within reach. Bed is low and in locked position. Hourly rounding in place.     Problem: Discharge Barriers/Planning  Goal: Patient's continuum of care needs will be met  Outcome: PROGRESSING AS EXPECTED  D/c orders received. Pt to go home today with her son.   Pt has hemovac drain in place  Katheryn paged TITO to clarify if pt needs to take the drain out by herself tomorrow at home. Waiting to hear back.

## 2018-10-16 NOTE — CARE PLAN
Problem: Safety  Goal: Will remain free from falls  Outcome: PROGRESSING AS EXPECTED  Pt has had no fall. PT is SBA with a FWW. PT was hoping with the walker and she was steady.     Problem: Infection  Goal: Will remain free from infection  Outcome: PROGRESSING AS EXPECTED  No s/s of infection. Hands are washed before and after entering the room. Prior to IV access the hub is scrubbed and allowed to dry for 15 secs.

## 2019-08-05 ENCOUNTER — HOSPITAL ENCOUNTER (OUTPATIENT)
Dept: RADIOLOGY | Facility: MEDICAL CENTER | Age: 74
End: 2019-08-05
Attending: INTERNAL MEDICINE
Payer: MEDICARE

## 2019-08-05 DIAGNOSIS — R68.81 EARLY SATIETY: ICD-10-CM

## 2019-08-05 PROCEDURE — A9541 TC99M SULFUR COLLOID: HCPCS

## 2019-10-15 ENCOUNTER — OFFICE VISIT (OUTPATIENT)
Dept: CARDIOLOGY | Facility: MEDICAL CENTER | Age: 74
End: 2019-10-15
Payer: MEDICARE

## 2019-10-15 VITALS
HEART RATE: 76 BPM | WEIGHT: 129 LBS | DIASTOLIC BLOOD PRESSURE: 62 MMHG | BODY MASS INDEX: 21.49 KG/M2 | OXYGEN SATURATION: 95 % | SYSTOLIC BLOOD PRESSURE: 110 MMHG | HEIGHT: 65 IN

## 2019-10-15 DIAGNOSIS — M35.00 SJOGREN'S SYNDROME, WITH UNSPECIFIED ORGAN INVOLVEMENT (HCC): ICD-10-CM

## 2019-10-15 DIAGNOSIS — R21 RASH IN ADULT: ICD-10-CM

## 2019-10-15 DIAGNOSIS — R07.2 PRECORDIAL PAIN: ICD-10-CM

## 2019-10-15 PROCEDURE — 99204 OFFICE O/P NEW MOD 45 MIN: CPT | Performed by: INTERNAL MEDICINE

## 2019-10-15 ASSESSMENT — ENCOUNTER SYMPTOMS
RESPIRATORY NEGATIVE: 1
SHORTNESS OF BREATH: 0
GASTROINTESTINAL NEGATIVE: 1
CONSTITUTIONAL NEGATIVE: 1
NEUROLOGICAL NEGATIVE: 1

## 2019-10-15 NOTE — PROGRESS NOTES
Chief Complaint   Patient presents with   • Chest Pain     New patient        Subjective:   Ana Doshi is a 74 y.o. female who is seen in consultation at the request of Dr. Ramos for evaluation of chest pain.  She has had several episodes of similar chest discomfort.  The 2 most recent episodes occurred about 2 weeks ago.  She was at the computer sitting down developed rather sharp left mid chest pain.  She does not recall the pain was pleuritic.  There is no sweatiness or nausea and the discomfort did not radiate across her chest or to her neck or jaw.  She had a similar, but less intense episode about a week later.  She recalls that she has had similar but less intense episodes in the past.  There is no history of known coronary disease.  Risk factor profile is negative for smoking, diabetes, family history of premature coronary disease or hyperlipidemia.  She has history of Sjogren's syndrome.  To 3 weeks ago, she broke out in a severe erythematous rash more prominent on her arms and upper torso.  She had a recent skin biopsy and is on prednisone currently.  History otherwise notable for a right ankle replacement.  She walks with a cane.  Family history: Father had a bypass at age 67.  He  at age 93.  Mother had asthma and heart failure and  at age 75.  Brother had bipolar disorder.    Social history: , non-smoker, retired RN.    Past Medical History:   Diagnosis Date   • Arthritis     osteo, generalized   • Cataract     removed bilat   • Pain 10/08/2018    chronic back, right ankle, 3-5/10   • Sjogren's disease (HCC)      Past Surgical History:   Procedure Laterality Date   • ANKLE TOTAL ARTHROPLASTY Right 10/15/2018    Procedure: ANKLE TOTAL ARTHROPLASTY-  MEDIAL MALLEOUS SCREW PLACEMENT AND COTTON OSTEOTOMY;  Surgeon: Scout Casarez M.D.;  Location: SURGERY [facility];  Service: Orthopedics   • ABDOMINAL HYSTERECTOMY TOTAL     • CATARACT EXTRACTION WITH IOL Bilateral      No  family history on file.  Social History     Socioeconomic History   • Marital status:      Spouse name: Not on file   • Number of children: Not on file   • Years of education: Not on file   • Highest education level: Not on file   Occupational History   • Not on file   Social Needs   • Financial resource strain: Not on file   • Food insecurity:     Worry: Not on file     Inability: Not on file   • Transportation needs:     Medical: Not on file     Non-medical: Not on file   Tobacco Use   • Smoking status: Never Smoker   • Smokeless tobacco: Never Used   Substance and Sexual Activity   • Alcohol use: No   • Drug use: No   • Sexual activity: Not on file   Lifestyle   • Physical activity:     Days per week: Not on file     Minutes per session: Not on file   • Stress: Not on file   Relationships   • Social connections:     Talks on phone: Not on file     Gets together: Not on file     Attends [employer] service: Not on file     Active member of club or organization: Not on file     Attends meetings of clubs or organizations: Not on file     Relationship status: Not on file   • Intimate partner violence:     Fear of current or ex partner: Not on file     Emotionally abused: Not on file     Physically abused: Not on file     Forced sexual activity: Not on file   Other Topics Concern   • Not on file   Social History Narrative   • Not on file     No Known Allergies  Outpatient Encounter Medications as of 10/15/2019   Medication Sig Dispense Refill   • psyllium (METAMUCIL) 58.12 % Pack Take 1 Packet by mouth 2 Times a Day.     • aspirin 81 MG tablet Take 81 mg by mouth every morning.     • predniSONE (DELTASONE) 1 MG Tab Take 4 mg by mouth every morning. sjogrens  Indications: tapered     • Estrogens Conjugated (PREMARIN) 0.45 MG Tab Take 0.45 mg by mouth every morning.     • gabapentin (NEURONTIN) 100 MG Cap Take 900 mg by mouth 3 times a day.     • docusate sodium (COLACE) 100 MG Cap Take 100 mg by mouth 2 times a  "day.     • ibuprofen (MOTRIN) 200 MG Tab Take 400 mg by mouth 3 times a day.       No facility-administered encounter medications on file as of 10/15/2019.      Review of Systems   Constitutional: Negative.    HENT: Negative.  Negative for hearing loss.         History of Sjogren's syndrome   Respiratory: Negative.  Negative for shortness of breath.    Cardiovascular: Positive for chest pain. Negative for leg swelling.   Gastrointestinal: Negative.    Musculoskeletal: Positive for joint pain.   Skin: Positive for rash.        Diffuse erythematous macular rash present for the last 2 weeks   Neurological: Negative.    Endo/Heme/Allergies: Negative.         Objective:   /62 (BP Location: Left arm, Patient Position: Sitting, BP Cuff Size: Adult)   Pulse 76   Ht 1.651 m (5' 5\")   Wt 58.5 kg (129 lb)   LMP  (LMP Unknown)   SpO2 95%   BMI 21.47 kg/m²     Physical Exam   Constitutional: She is oriented to person, place, and time. No distress.   HENT:   Head: Normocephalic and atraumatic.   Eyes: Pupils are equal, round, and reactive to light. No scleral icterus.   Neck: No JVD present.   Cardiovascular: Normal rate and regular rhythm.   No murmur heard.  Pulmonary/Chest: No respiratory distress. She has no wheezes.   Abdominal: Soft. She exhibits no distension. There is no tenderness.   Musculoskeletal: She exhibits no edema.   Neurological: She is alert and oriented to person, place, and time.   Skin: Skin is warm.   Erythematous rash involving predominately arms upper chest and back.  No papules no bullae.   Psychiatric: She has a normal mood and affect.       Assessment:     1. Precordial pain  Treadmill Stress   2. Sjogren's syndrome, with unspecified organ involvement ([facility])     3. Rash in adult         Medical Decision Making:  Today's Assessment / Status / Plan:   EKG: Personally reviewed.  My interpretation is sinus rhythm normal EKG.    Chest pain: Atypical and the pain came on at rest and did not " radiate across her chest.  EKG is normal and she has no obvious risk factors other than her father's history.  She does have a ankle replacement but states she can walk fine on a treadmill as long she holds on we will therefore schedule treadmill testing.  She will be notified of the results of the testing.    Rash: Diffuse erythematous rash.  Etiology is uncertain.    History of Sjogren's syndrome:

## 2019-10-15 NOTE — LETTER
[facility]   [address] [place] [address]  [place] [address]  Phone: [phone]  Fax: [phone]              Ana Doshi  1945    Encounter Date: 10/15/2019    iNlo Hicks M.D.          PROGRESS NOTE:  Chief Complaint   Patient presents with   • Chest Pain     New patient        Subjective:   Ana Doshi is a 74 y.o. female who is seen in consultation at the request of Dr. Ramos for evaluation of chest pain.  She has had several episodes of similar chest discomfort.  The 2 most recent episodes occurred about 2 weeks ago.  She was at the computer sitting down developed rather sharp left mid chest pain.  She does not recall the pain was pleuritic.  There is no sweatiness or nausea and the discomfort did not radiate across her chest or to her neck or jaw.  She had a similar, but less intense episode about a week later.  She recalls that she has had similar but less intense episodes in the past.  There is no history of known coronary disease.  Risk factor profile is negative for smoking, diabetes, family history of premature coronary disease or hyperlipidemia.  She has history of Sjogren's syndrome.  To 3 weeks ago, she broke out in a severe erythematous rash more prominent on her arms and upper torso.  She had a recent skin biopsy and is on prednisone currently.  History otherwise notable for a right ankle replacement.  She walks with a cane.  Family history: Father had a bypass at age 67.  He  at age 93.  Mother had asthma and heart failure and  at age 75.  Brother had bipolar disorder.    Social history: , non-smoker, retired RN.    Past Medical History:   Diagnosis Date   • Arthritis     osteo, generalized   • Cataract     removed bilat   • Pain 10/08/2018    chronic back, right ankle, 3-5/10   • Sjogren's disease (HCC)      Past Surgical History:   Procedure Laterality Date   • ANKLE TOTAL ARTHROPLASTY Right 10/15/2018    Procedure: ANKLE TOTAL ARTHROPLASTY-  MEDIAL MALLEOUS SCREW PLACEMENT AND COTTON OSTEOTOMY;  Surgeon: Scout Casarez M.D.;  Location: SURGERY [facility];  Service: Orthopedics   • ABDOMINAL HYSTERECTOMY TOTAL  1992   • CATARACT EXTRACTION WITH IOL Bilateral      No family history on file.  Social History     Socioeconomic History   • Marital status:      Spouse name: Not on file   • Number of children: Not on file   • Years of education: Not on file   • Highest education level: Not on file   Occupational History   • Not on file   Social Needs   • Financial resource strain: Not on file   • Food insecurity:     Worry: Not on file     Inability: Not on file   • Transportation needs:     Medical: Not on file     Non-medical: Not on file   Tobacco Use   • Smoking status: Never Smoker   • Smokeless tobacco: Never Used   Substance and Sexual Activity   • Alcohol use: No   • Drug use: No   • Sexual activity: Not on file   Lifestyle   • Physical activity:     Days per week: Not on file     Minutes per session: Not on file   • Stress: Not on file   Relationships   • Social connections:     Talks on phone: Not on file     Gets together: Not on file     Attends [employer] service: Not on file     Active member of club or organization: Not on file     Attends meetings of clubs or organizations: Not on file     Relationship status: Not on file   • Intimate partner violence:     Fear of current or ex partner: Not on file     Emotionally abused: Not on file     Physically abused: Not on file     Forced sexual activity: Not on file   Other Topics Concern   • Not on file   Social History Narrative   • Not on file     No Known Allergies  Outpatient Encounter Medications as of 10/15/2019   Medication Sig Dispense Refill   • psyllium (METAMUCIL) 58.12 % Pack Take 1 Packet by mouth 2 Times a Day.     • aspirin 81 MG tablet Take 81 mg by mouth every morning.     • predniSONE (DELTASONE) 1 MG Tab Take 4 mg by mouth every  "morning. sjogrens  Indications: tapered     • Estrogens Conjugated (PREMARIN) 0.45 MG Tab Take 0.45 mg by mouth every morning.     • gabapentin (NEURONTIN) 100 MG Cap Take 900 mg by mouth 3 times a day.     • docusate sodium (COLACE) 100 MG Cap Take 100 mg by mouth 2 times a day.     • ibuprofen (MOTRIN) 200 MG Tab Take 400 mg by mouth 3 times a day.       No facility-administered encounter medications on file as of 10/15/2019.      Review of Systems   Constitutional: Negative.    HENT: Negative.  Negative for hearing loss.         History of Sjogren's syndrome   Respiratory: Negative.  Negative for shortness of breath.    Cardiovascular: Positive for chest pain. Negative for leg swelling.   Gastrointestinal: Negative.    Musculoskeletal: Positive for joint pain.   Skin: Positive for rash.        Diffuse erythematous macular rash present for the last 2 weeks   Neurological: Negative.    Endo/Heme/Allergies: Negative.         Objective:   /62 (BP Location: Left arm, Patient Position: Sitting, BP Cuff Size: Adult)   Pulse 76   Ht 1.651 m (5' 5\")   Wt 58.5 kg (129 lb)   LMP  (LMP Unknown)   SpO2 95%   BMI 21.47 kg/m²      Physical Exam   Constitutional: She is oriented to person, place, and time. No distress.   HENT:   Head: Normocephalic and atraumatic.   Eyes: Pupils are equal, round, and reactive to light. No scleral icterus.   Neck: No JVD present.   Cardiovascular: Normal rate and regular rhythm.   No murmur heard.  Pulmonary/Chest: No respiratory distress. She has no wheezes.   Abdominal: Soft. She exhibits no distension. There is no tenderness.   Musculoskeletal: She exhibits no edema.   Neurological: She is alert and oriented to person, place, and time.   Skin: Skin is warm.   Erythematous rash involving predominately arms upper chest and back.  No papules no bullae.   Psychiatric: She has a normal mood and affect.       Assessment:     1. Precordial pain  Treadmill Stress   2. Sjogren's syndrome, " with unspecified organ involvement (HCC)     3. Rash in adult         Medical Decision Making:  Today's Assessment / Status / Plan:   EKG: Personally reviewed.  My interpretation is sinus rhythm normal EKG.    Chest pain: Atypical and the pain came on at rest and did not radiate across her chest.  EKG is normal and she has no obvious risk factors other than her father's history.  She does have a ankle replacement but states she can walk fine on a treadmill as long she holds on we will therefore schedule treadmill testing.  She will be notified of the results of the testing.    Rash: Diffuse erythematous rash.  Etiology is uncertain.    History of Sjogren's syndrome:      Vijay Ramos M.D.  [address] [facility] [id number]  VIA Facsimile: [phone]

## 2021-01-14 DIAGNOSIS — Z23 NEED FOR VACCINATION: ICD-10-CM

## (undated) DEVICE — GLOVE BIOGEL SZ 7.5 SURGICAL PF LTX - (50PR/BX 4BX/CA)

## (undated) DEVICE — TOURNIQUET, STERILE 24 (YELLOW)

## (undated) DEVICE — CANISTER SUCTION 3000ML MECHANICAL FILTER AUTO SHUTOFF MEDI-VAC NONSTERILE LF DISP  (40EA/CA)

## (undated) DEVICE — DRAPE C ARMOR (12EA/CA)

## (undated) DEVICE — MASK, LARYNGEAL AIRWAY #4

## (undated) DEVICE — DRAPE C-ARM LARGE 41IN X 74 IN - (10/BX 2BX/CA)

## (undated) DEVICE — PROTECTOR ULNA NERVE - (36PR/CA)

## (undated) DEVICE — MASK ANESTHESIA ADULT  - (100/CA)

## (undated) DEVICE — BLADE SURGICAL #15 - (50/BX 3BX/CA)

## (undated) DEVICE — KIT ROOM DECONTAMINATION

## (undated) DEVICE — GLOVE BIOGEL INDICATOR SZ 6.5 SURGICAL PF LTX - (50PR/BX 4BX/CA)

## (undated) DEVICE — TUBING CLEARLINK DUO-VENT - C-FLO (48EA/CA)

## (undated) DEVICE — CHLORAPREP 26 ML APPLICATOR - ORANGE TINT(25/CA)

## (undated) DEVICE — SET EXTENSION WITH 2 PORTS (48EA/CA) ***PART #2C8610 IS A SUBSTITUTE*****

## (undated) DEVICE — DISPOSABLE WOUND VAC PICO 10 X 30 CM - WOUND CARE (3/CA)

## (undated) DEVICE — GOWN WARMING STANDARD FLEX - (30/CA)

## (undated) DEVICE — SUCTION INSTRUMENT YANKAUER BULBOUS TIP W/O VENT (50EA/CA)

## (undated) DEVICE — SODIUM CHL IRRIGATION 0.9% 1000ML (12EA/CA)

## (undated) DEVICE — PACK LOWER EXTREMITY - (2/CA)

## (undated) DEVICE — STOCKINET BIAS 6 IN STERILE - (20/CA)

## (undated) DEVICE — BLADE SAW OSCILLATING 8.0 X 1.27 X 70MM

## (undated) DEVICE — GLOVE BIOGEL PI INDICATOR SZ 8.0 SURGICAL PF LF -(50/BX 4BX/CA)

## (undated) DEVICE — ELECTRODE DUAL RETURN W/ CORD - (50/PK)

## (undated) DEVICE — SUTURE 3-0 VICRYL PLUS SH - 8X 18 INCH (12/BX)

## (undated) DEVICE — KIT ANESTHESIA W/CIRCUIT & 3/LT BAG W/FILTER (20EA/CA)

## (undated) DEVICE — BLOCK

## (undated) DEVICE — CONTAINER SPECIMEN BAG OR - STERILE 4 OZ W/LID (100EA/CA)

## (undated) DEVICE — LACTATED RINGERS INJ 1000 ML - (14EA/CA 60CA/PF)

## (undated) DEVICE — GLOVE BIOGEL SZ 6 PF LATEX - (50EA/BX 4BX/CA)

## (undated) DEVICE — GLOVE BIOGEL ECLIPSE  PF LATEX SIZE 6.5 (50PR/BX)

## (undated) DEVICE — SUTURE 3-0 ETHILON PS-1 (36PK/BX)

## (undated) DEVICE — CLOSURE SKIN STRIP 1/2 X 4 IN - (STERI STRIP) (50/BX 4BX/CA)

## (undated) DEVICE — INSTRUMENT KIT

## (undated) DEVICE — GLOVE BIOGEL ECLIPSE PF LATEX SIZE 8.0  (50PR/BX)

## (undated) DEVICE — NEPTUNE 4 PORT MANIFOLD - (20/PK)

## (undated) DEVICE — DRESSING 3X8 ADAPTIC GAUZE - NON-ADHERING (36/PK 6PK/BX)

## (undated) DEVICE — WRAP CO-FLEX 4IN X 5YD STERIL - SELF-ADHERENT (18/CA)

## (undated) DEVICE — SUTURE GENERAL

## (undated) DEVICE — HEAD HOLDER JUNIOR/ADULT

## (undated) DEVICE — PADDING CAST 6 IN STERILE - 6 X 4 YDS (24/CA)

## (undated) DEVICE — COTTON ROLL 1 POUND BIOSEAL - (5RL/CA)

## (undated) DEVICE — SYRINGE ASEPTO - (50EA/CA

## (undated) DEVICE — GLOVE BIOGEL INDICATOR SZ 7.5 SURGICAL PF LTX - (50PR/BX 4BX/CA)

## (undated) DEVICE — GUIDE PIN 1.25X150 THRD OIC - (6EA/BX) (5TX6=30)

## (undated) DEVICE — SPLINT PLASTER 5 IN X 30 IN - (50EA/BX 6BX/CA)

## (undated) DEVICE — BLADE SAW RECIPROCATING 8.0 X 0.98 X 43MM

## (undated) DEVICE — PAD LAP STERILE 18 X 18 - (5/PK 40PK/CA)

## (undated) DEVICE — SET LEADWIRE 5 LEAD BEDSIDE DISPOSABLE ECG (1SET OF 5/EA)

## (undated) DEVICE — GOWN SURGEONS X-LARGE - DISP. (30/CA)

## (undated) DEVICE — SLEEVE, VASO, THIGH, MED

## (undated) DEVICE — DRAPE LARGE 3 QUARTER - (20/CA)

## (undated) DEVICE — ELECTRODE 850 FOAM ADHESIVE - HYDROGEL RADIOTRNSPRNT (50/PK)

## (undated) DEVICE — DRAPE STRLE REG TOWEL 18X24 - (10/BX 4BX/CA)"

## (undated) DEVICE — SENSOR SPO2 NEO LNCS ADHESIVE (20/BX) SEE USER NOTES

## (undated) DEVICE — KIT EVACUATER 3 SPRING PVC LF 1/8 DRAIN SIZE (10EA/CA)"

## (undated) DEVICE — BIT DRILL 2.0MM AO (2TX2=4)

## (undated) DEVICE — DRILL BIT 2.7 X 160 CANN OIC - (5TX2=10)

## (undated) DEVICE — STERI STRIP COMPOUND BENZOIN - TINCTURE 0.6ML WITH APPLICATOR (40EA/BX)

## (undated) DEVICE — GLOVE BIOGEL PI INDICATOR SZ 6.5 SURGICAL PF LF - (50/BX 4BX/CA)